# Patient Record
Sex: MALE | Race: OTHER | HISPANIC OR LATINO | URBAN - METROPOLITAN AREA
[De-identification: names, ages, dates, MRNs, and addresses within clinical notes are randomized per-mention and may not be internally consistent; named-entity substitution may affect disease eponyms.]

---

## 2018-08-19 ENCOUNTER — EMERGENCY (EMERGENCY)
Facility: HOSPITAL | Age: 63
LOS: 1 days | Discharge: HOME | End: 2018-08-19
Attending: EMERGENCY MEDICINE
Payer: COMMERCIAL

## 2018-08-19 VITALS
HEIGHT: 68 IN | DIASTOLIC BLOOD PRESSURE: 87 MMHG | TEMPERATURE: 98 F | HEART RATE: 72 BPM | RESPIRATION RATE: 18 BRPM | OXYGEN SATURATION: 97 % | WEIGHT: 179.9 LBS | SYSTOLIC BLOOD PRESSURE: 138 MMHG

## 2018-08-19 VITALS
OXYGEN SATURATION: 97 % | SYSTOLIC BLOOD PRESSURE: 117 MMHG | RESPIRATION RATE: 18 BRPM | HEART RATE: 64 BPM | DIASTOLIC BLOOD PRESSURE: 88 MMHG | TEMPERATURE: 97 F

## 2018-08-19 PROCEDURE — 82962 GLUCOSE BLOOD TEST: CPT

## 2018-08-19 PROCEDURE — 99284 EMERGENCY DEPT VISIT MOD MDM: CPT | Mod: 25

## 2018-08-19 PROCEDURE — 96375 TX/PRO/DX INJ NEW DRUG ADDON: CPT

## 2018-08-19 PROCEDURE — 96374 THER/PROPH/DIAG INJ IV PUSH: CPT

## 2018-08-19 PROCEDURE — 99283 EMERGENCY DEPT VISIT LOW MDM: CPT

## 2018-08-19 RX ORDER — SODIUM CHLORIDE 9 MG/ML
1000 INJECTION INTRAMUSCULAR; INTRAVENOUS; SUBCUTANEOUS ONCE
Qty: 0 | Refills: 0 | Status: COMPLETED | OUTPATIENT
Start: 2018-08-19 | End: 2018-08-19

## 2018-08-19 RX ORDER — DIPHENHYDRAMINE HCL 50 MG
25 CAPSULE ORAL ONCE
Qty: 0 | Refills: 0 | Status: COMPLETED | OUTPATIENT
Start: 2018-08-19 | End: 2018-08-19

## 2018-08-19 RX ORDER — FAMOTIDINE 10 MG/ML
1 INJECTION INTRAVENOUS
Qty: 14 | Refills: 0 | OUTPATIENT
Start: 2018-08-19 | End: 2018-08-25

## 2018-08-19 RX ORDER — DIPHENHYDRAMINE HCL 50 MG
1 CAPSULE ORAL
Qty: 15 | Refills: 0 | OUTPATIENT
Start: 2018-08-19 | End: 2018-08-23

## 2018-08-19 RX ORDER — FAMOTIDINE 10 MG/ML
20 INJECTION INTRAVENOUS ONCE
Qty: 0 | Refills: 0 | Status: COMPLETED | OUTPATIENT
Start: 2018-08-19 | End: 2018-08-19

## 2018-08-19 RX ADMIN — Medication 125 MILLIGRAM(S): at 10:34

## 2018-08-19 RX ADMIN — FAMOTIDINE 20 MILLIGRAM(S): 10 INJECTION INTRAVENOUS at 10:34

## 2018-08-19 RX ADMIN — SODIUM CHLORIDE 1000 MILLILITER(S): 9 INJECTION INTRAMUSCULAR; INTRAVENOUS; SUBCUTANEOUS at 10:34

## 2018-08-19 RX ADMIN — Medication 25 MILLIGRAM(S): at 10:34

## 2018-08-19 NOTE — ED STATDOCS - ATTENDING CONTRIBUTION TO CARE
The patient seen and examined.  The patient has allergic reaction to dye.  No airway compromise.    I, Zackary Sanz, performed the initial face to face bedside interview with this patient regarding history of present illness, review of symptoms and relevant past medical, social and family history.  I completed an independent physical examination.  I was the initial provider who evaluated this patient. I have signed out the follow up of any pending tests (i.e. labs, radiological studies) to the ACP.  I have communicated the patient’s plan of care and disposition with the ACP.

## 2018-08-19 NOTE — ED ADULT NURSE NOTE - CADM POA PRESS ULCER
Exam is normal today  Please follow up with the otolaryngology (ENT) physician at your appointment that's already scheduled  If you have worsening symptoms or dizziness, nausea, vomiting, pain, please be re-evaluated before then      At [facility], we strive to deliver an exceptional experience to you, every time we see you.  If you receive a survey in the mail, please send us back your thoughts. We really do value your feedback.    Based on your medical history, these are the current health maintenance/preventive care services that you are due for (some may have been done at this visit.)  Health Maintenance Due   Topic Date Due     BMP Q6 MOS  07/22/2018         Suggested websites for health information:  Www.Intelligroup.IsoPlexis : Up to date and easily searchable information on multiple topics.  Www.medlineplus.gov : medication info, interactive tutorials, watch real surgeries online  Www.familydoctor.org : good info from the Academy of Family Physicians  Www.cdc.gov : public health info, travel advisories, epidemics (H1N1)  Www.aap.org : children's health info, normal development, vaccinations  Www.health.state.[place] : [place] dept of health, public health issues in [place], N1N1    Your care team:                            Family Medicine Internal Medicine   MD Tre Hicks MD Shantel Branch-Fleming, MD Katya Georgiev PA-C Nam Ho, MD Pediatrics   ABY Snyder, MD Maritza Tinsley CNP, MD Deborah Mielke, MD Kim Thein, APRN [facility]      Clinic hours: Monday - Thursday 7 am-7 pm; Fridays 7 am-5 pm.   Urgent care: Monday - Friday 11 am-9 pm; Saturday and Sunday 9 am-5 pm.  Pharmacy : Monday -Thursday 8 am-8 pm; Friday 8 am-6 pm; Saturday and Sunday 9 am-5 pm.     Clinic: [phone]   Pharmacy: [phone]    
No

## 2018-08-19 NOTE — ED STATDOCS - MEDICAL DECISION MAKING DETAILS
Ears: no ear pain and no hearing problems.Nose: no nasal congestion and no nasal drainage.Mouth/Throat: no dysphagia, no hoarseness and no throat pain.Neck: no lumps, no pain, no stiffness and no swollen glands. contact dermatitis: FS, pepcid, steroids, benadry, d/c dye from hair

## 2018-08-19 NOTE — ED STATDOCS - PROGRESS NOTE DETAILS
patient re-evaluated feeling better, swelling improved RX pepcid, benadryl, prednisone to f/u with allergist and DC dye

## 2018-08-19 NOTE — ED STATDOCS - OBJECTIVE STATEMENT
This is a 63 year old male with c/o allergic reaction x 1 day.  He notes recently dyed his hair to black tint.  She notes This is a 63 year old male with c/o allergic reaction x 1 day.  He notes recently dyed his hair to black tint.  He notes no prior use of dye.  He reports h/o DM controlled with diet, not currently taking any medications.  PCP Valerie.  He denies any SOB, chest pain, n/v/d or any sick contacts, recent travel or rashes.

## 2018-08-19 NOTE — ED ADULT TRIAGE NOTE - CHIEF COMPLAINT QUOTE
Patient is awake and oriented times 4, complains of an allergic reaction that started yesterday, patient noticed swelling, redness, and itchiness to his lips and chin s/p dying his beard, patient denies any difficulty breathing or swallowing

## 2019-02-26 ENCOUNTER — APPOINTMENT (OUTPATIENT)
Dept: ORTHOPEDIC SURGERY | Facility: CLINIC | Age: 64
End: 2019-02-26

## 2019-03-11 ENCOUNTER — APPOINTMENT (OUTPATIENT)
Dept: ORTHOPEDIC SURGERY | Facility: CLINIC | Age: 64
End: 2019-03-11

## 2019-12-28 ENCOUNTER — APPOINTMENT (OUTPATIENT)
Dept: ORTHOPEDIC SURGERY | Facility: CLINIC | Age: 64
End: 2019-12-28

## 2020-01-04 ENCOUNTER — APPOINTMENT (OUTPATIENT)
Dept: ORTHOPEDIC SURGERY | Facility: CLINIC | Age: 65
End: 2020-01-04
Payer: COMMERCIAL

## 2020-01-04 VITALS
BODY MASS INDEX: 27.28 KG/M2 | SYSTOLIC BLOOD PRESSURE: 124 MMHG | DIASTOLIC BLOOD PRESSURE: 76 MMHG | HEART RATE: 70 BPM | HEIGHT: 68 IN | WEIGHT: 180 LBS

## 2020-01-04 DIAGNOSIS — Z78.9 OTHER SPECIFIED HEALTH STATUS: ICD-10-CM

## 2020-01-04 DIAGNOSIS — M24.812 OTHER SPECIFIC JOINT DERANGEMENTS OF LEFT SHOULDER, NOT ELSEWHERE CLASSIFIED: ICD-10-CM

## 2020-01-04 DIAGNOSIS — Z86.39 PERSONAL HISTORY OF OTHER ENDOCRINE, NUTRITIONAL AND METABOLIC DISEASE: ICD-10-CM

## 2020-01-04 PROCEDURE — 99204 OFFICE O/P NEW MOD 45 MIN: CPT

## 2020-01-04 PROCEDURE — 73030 X-RAY EXAM OF SHOULDER: CPT | Mod: LT

## 2020-01-04 RX ORDER — MELOXICAM 15 MG/1
15 TABLET ORAL DAILY
Qty: 30 | Refills: 0 | Status: ACTIVE | COMMUNITY
Start: 2020-01-04 | End: 1900-01-01

## 2020-01-04 NOTE — PHYSICAL EXAM
[de-identified] : 4 views x-rays left shoulder taken in office today, 1/4/2020: Type 3 acromial spur noted.  Osteoarthritis seen within the glenohumeral joint mild on x-ray. No fractures or dislocations present.  No other abnormalities seen. [de-identified] : Laterality: Left shoulder\par \par General: Alert and oriented x3.  In no acute distress.  Pleasant in nature with a normal affect.  No apparent respiratory distress. \par Erythema, Warmth, Rubor: Negative\par Swelling: Negative\par \par ROM:\par FF: 90 degrees active with pain.  PROM: 140 degrees with pain. \par ER: 45 degrees compared to 85 degrees right shoulder\par Abduction: 70 degrees with pain\par IR: Normal\par IR behind back: Left Glute due to pain. \par \par Special Test:\par Empty Can Sign: Positive\par Newberry's Sign: Positive\par Dozier/Neer Sign: Positive\par Speed's Sign: Negative\par Yergason's Sign: Negative\par Apprehension/Relocation: Negative\par Sulcus Sign: Negative\par Cross Arm Adduction/Pain over AC-J: Negative\par Belly Press/Lift Off Behind Back: Negative\par \par Neurovascularly intact motor and sensory\par

## 2020-01-04 NOTE — DISCUSSION/SUMMARY
[de-identified] : Assessment: Chronic left shoulder pain.\par \par Plan:\par #1. MRI of left shoulder order without contrast to rule out rotator cuff tear.\par #2. Meloxicam 15 mg prescribed to take as directed with food.\par #3. Referral to followup with Dr. Arnold Barbour post MRI to review.\par #4. All of the patient's questions answered. He understands the treatment course at this time.

## 2020-01-04 NOTE — REASON FOR VISIT
[Initial Visit] : an initial visit for [Other: ____] : [unfilled] [[employer] ] : provided by [employer]   [FreeTextEntry1] : [id number] [FreeTextEntry2] : Aren [TWNoteComboBox1] : Gabonese

## 2020-01-04 NOTE — HISTORY OF PRESENT ILLNESS
[de-identified] : Xavier is a 64-year-old male who is complaining of chronic left shoulder pain. The pain has been going on for years. The patient is very active and works out in a gym. He states that the shoulder is causing him 9/10 pain today. The pain affects him at night at rest and it also affects his sleep. He is unable to move his arm over his head without severe pain. He denies numbness and tingling going down the left upper extremity. He has no other complaints.

## 2020-01-07 PROBLEM — M24.812 INTERNAL DERANGEMENT OF LEFT SHOULDER: Status: ACTIVE | Noted: 2020-01-04

## 2020-02-03 ENCOUNTER — APPOINTMENT (OUTPATIENT)
Dept: ORTHOPEDIC SURGERY | Facility: CLINIC | Age: 65
End: 2020-02-03
Payer: COMMERCIAL

## 2020-02-03 VITALS — WEIGHT: 183 LBS | BODY MASS INDEX: 27.74 KG/M2 | HEIGHT: 68 IN

## 2020-02-03 DIAGNOSIS — M75.42 IMPINGEMENT SYNDROME OF LEFT SHOULDER: ICD-10-CM

## 2020-02-03 DIAGNOSIS — G89.29 PAIN IN LEFT SHOULDER: ICD-10-CM

## 2020-02-03 DIAGNOSIS — S46.012A STRAIN OF MUSCLE(S) AND TENDON(S) OF THE ROTATOR CUFF OF LEFT SHOULDER, INITIAL ENCOUNTER: ICD-10-CM

## 2020-02-03 DIAGNOSIS — M25.512 PAIN IN LEFT SHOULDER: ICD-10-CM

## 2020-02-03 PROCEDURE — 99213 OFFICE O/P EST LOW 20 MIN: CPT

## 2020-02-03 NOTE — PHYSICAL EXAM
[de-identified] : Physical Exam:\par General: Well appearing, no acute distress, A&O\par Neurologic: A&Ox3, No focal deficits\par Head: NCAT without abrasions, lacerations, or ecchymosis to head, face, or scalp\par Eyes: No scleral icterus, no gross abnormalities\par Respiratory: Equal chest wall expansion bilaterally, no accessory muscle use\par Lymphatic: No lymphadenopathy palpated\par Skin: Warm and dry\par Psychiatric: Normal mood and affect\par Left Shoulder\par ·	Inspection/Palpation: no tenderness, swelling or deformities\par ·	Range of Motion: no crepitus with ROM; Active FF 90; ER at side 15; IR to belt; Passive ; ER at side 45; IR to belt\par ·	Strength: forward elevation in scapular plane [4/5], internal rotation [4/5], external rotation [4/5], adduction [4/5] and abduction [4/5]\par ·	Stability: no joint instability on provocative testing\par ·	Tests: Dozier test positive, Neer positive, positive drop arm test secondary to pain, bear hug test positive, Napolean sign negative, cross arm adduction negative, lift off sign positive, hornblowers sign negative, speeds test negative, Yergason's test negative, no bicipital groove tenderness, Gill's Active Compression test negative, whipple test positive, bicep's load II test negative\par \par Right Shoulder\par ·	Inspection/Palpation: no tenderness, swelling or deformities\par ·	Range of Motion: full and painless in all planes, no crepitus\par ·	Strength: forward elevation in scapular plane 5/5, internal rotation 5/5, external rotation 5/5, adduction 5/5 and abduction 5/5\par ·	Stability: no joint instability on provocative testing\par ·	Tests: Dozier test negative, Neer sign negative, negative drop arm test secondary to pain, bear hug test negative, Napolean sign negative, cross arm adduction negative, lift off sign positive, hornblowers sign negative, speeds test negative, Yergason's test negative, no bicipital groove tenderness, Gill's Active Compression test negative [de-identified] : MRI performed at outside facility was available for me to review.  This demonstrates a full-thickness tear of the supraspinatus with retraction to the lateral portion of the humeral head.  Mild tendinosis of the supraspinatus.  Mild tendinosis of the infraspinatus and subscapularis.

## 2020-02-03 NOTE — DISCUSSION/SUMMARY
[de-identified] : Xavier is a 64-year-old male who comes in complaining of left shoulder pain.  He saw Corrigan visit ordered an MRI showing a full-thickness tear of his rotator cuff.  He attempted conservative measures with oral anti-inflammatories and physical therapy.  He was completely refractory.  Today had a thorough discussion with him regarding operative and nonoperative management.  After thorough discussion the patient elected to defer all other conservative care and move forward with operative intervention.  All risks, benefits and alternatives to the proposed surgical procedure, left shoulder arthroscopy with rotator cuff repair and possible biceps tenodesis, were discussed in great detail with the patient. Risks include but are not limited to pain, bleeding, infection, stiffness, [default value], medical complications (including DVT, PE, MI), and risks of anesthesia. The patient expressed understanding and all questions were answered. The patient is electing to proceed, and will have the patient scheduled accordingly.

## 2020-02-03 NOTE — HISTORY OF PRESENT ILLNESS
[8] : a maximum pain level of 8/10 [de-identified] : YIFAN is a 64 year male who presents today with complaints of left shoulder pain.  Patient saw Zan Galicia and an MRI was ordered.  He states his left shoulder pain is progressively worsened over the past 4 months and is worse with overhead activities.  Appears to be relieved with rest.  He also planes of significant weakness.  He denies numbness or paresthesias.  He has tried oral anti-inflammatories and physical therapy and has been completely refractory.  At baseline her his pain is a 4 out of 10.\par

## 2020-03-11 ENCOUNTER — EMERGENCY (EMERGENCY)
Facility: HOSPITAL | Age: 65
LOS: 1 days | Discharge: HOME | End: 2020-03-11
Attending: EMERGENCY MEDICINE
Payer: COMMERCIAL

## 2020-03-11 VITALS
HEART RATE: 79 BPM | SYSTOLIC BLOOD PRESSURE: 143 MMHG | DIASTOLIC BLOOD PRESSURE: 83 MMHG | TEMPERATURE: 98 F | HEIGHT: 68 IN | WEIGHT: 179.9 LBS | RESPIRATION RATE: 18 BRPM | OXYGEN SATURATION: 98 %

## 2020-03-11 PROCEDURE — 93010 ELECTROCARDIOGRAM REPORT: CPT

## 2020-03-11 PROCEDURE — 99284 EMERGENCY DEPT VISIT MOD MDM: CPT | Mod: 25

## 2020-03-11 PROCEDURE — 96372 THER/PROPH/DIAG INJ SC/IM: CPT

## 2020-03-11 PROCEDURE — 99284 EMERGENCY DEPT VISIT MOD MDM: CPT

## 2020-03-11 PROCEDURE — 72125 CT NECK SPINE W/O DYE: CPT | Mod: 26

## 2020-03-11 PROCEDURE — T1013: CPT

## 2020-03-11 PROCEDURE — 72125 CT NECK SPINE W/O DYE: CPT

## 2020-03-11 PROCEDURE — 93005 ELECTROCARDIOGRAM TRACING: CPT

## 2020-03-11 RX ORDER — METHOCARBAMOL 500 MG/1
2 TABLET, FILM COATED ORAL
Qty: 30 | Refills: 0
Start: 2020-03-11 | End: 2020-03-15

## 2020-03-11 RX ORDER — IBUPROFEN 200 MG
1 TABLET ORAL
Qty: 28 | Refills: 0
Start: 2020-03-11 | End: 2020-03-17

## 2020-03-11 RX ORDER — METHOCARBAMOL 500 MG/1
1500 TABLET, FILM COATED ORAL ONCE
Refills: 0 | Status: COMPLETED | OUTPATIENT
Start: 2020-03-11 | End: 2020-03-11

## 2020-03-11 RX ORDER — KETOROLAC TROMETHAMINE 30 MG/ML
30 SYRINGE (ML) INJECTION ONCE
Refills: 0 | Status: DISCONTINUED | OUTPATIENT
Start: 2020-03-11 | End: 2020-03-11

## 2020-03-11 RX ADMIN — METHOCARBAMOL 1500 MILLIGRAM(S): 500 TABLET, FILM COATED ORAL at 20:03

## 2020-03-11 RX ADMIN — Medication 30 MILLIGRAM(S): at 20:02

## 2020-03-11 NOTE — ED PROVIDER NOTE - PATIENT PORTAL LINK FT
You can access the FollowMyHealth Patient Portal offered by [facility] by registering at the following website: [email or web address]. By joining [employer]’s FollowMyHealth portal, you will also be able to view your health information using other applications (apps) compatible with our system.

## 2020-03-11 NOTE — ED PROVIDER NOTE - PHYSICAL EXAMINATION
Const: Awake, alert and oriented. In no acute distress. Well appearing. Sitting in C-collar  HEENT: NC/AT. Moist mucous membranes.  Eyes: No scleral icterus. EOMI.  Neck:. Full ROM without pain. TTP midline around C5-C7 as well as paracervical b/l  Cardiac: Regular rate and regular rhythm. +S1/S2. Peripheral pulses 2+ and symmetric. No LE edema.  Resp: Speaking in full sentences. No evidence of respiratory distress. No wheezes, rales or rhonchi.  Abd: Soft, non-tender, non-distended. Normal bowel sounds in all 4 quadrants. No guarding or rebound.  Back/MSK: Spine midline and non-tender. No CVAT. Mild ttp across upper back, b/l paraspinal thoracic region. FROM extremities x 4  Skin: No seatbelt sign, rashes, abrasions or lacerations.  Neuro: Awake, alert & oriented x 3. CN II-XII intact. Sensorimotor intact x 4. No focal deficits. Pt ambulatory with steady gait

## 2020-03-11 NOTE — ED PROVIDER NOTE - NS ED ROS FT
Gen: denies fever, chills, fatigue, weight loss  Skin: denies rashes, laceration, bruising  HEENT: denies visual changes, ear pain, nasal congestion, throat pain  Respiratory: denies WALTON, SOB, cough, wheezing  Cardiovascular: denies chest pain, palpitations, diaphoresis, LE edema  GI: denies abdominal pain, n/v/d  : denies dysuria, frequency, urgency, bowel/bladder incontinence  MSK: +Neck pain, Upper back pain. Denies joint swelling/pain  Neuro: denies headache, dizziness, weakness, numbness  Psych: denies anxiety, depression, SI/HI, visual/auditory hallucinations
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
## 2020-04-07 ENCOUNTER — APPOINTMENT (OUTPATIENT)
Dept: ORTHOPEDIC SURGERY | Facility: HOSPITAL | Age: 65
End: 2020-04-07

## 2021-04-14 ENCOUNTER — APPOINTMENT (OUTPATIENT)
Dept: NEUROLOGY | Facility: CLINIC | Age: 66
End: 2021-04-14

## 2023-11-12 ENCOUNTER — EMERGENCY (EMERGENCY)
Facility: HOSPITAL | Age: 68
LOS: 1 days | Discharge: HOME | End: 2023-11-12
Attending: EMERGENCY MEDICINE
Payer: COMMERCIAL

## 2023-11-12 VITALS
HEIGHT: 67 IN | OXYGEN SATURATION: 98 % | WEIGHT: 175.05 LBS | RESPIRATION RATE: 18 BRPM | HEART RATE: 87 BPM | SYSTOLIC BLOOD PRESSURE: 143 MMHG | TEMPERATURE: 98 F | DIASTOLIC BLOOD PRESSURE: 90 MMHG

## 2023-11-12 PROCEDURE — 99284 EMERGENCY DEPT VISIT MOD MDM: CPT

## 2023-11-12 PROCEDURE — 73030 X-RAY EXAM OF SHOULDER: CPT

## 2023-11-12 PROCEDURE — 71046 X-RAY EXAM CHEST 2 VIEWS: CPT | Mod: 26

## 2023-11-12 PROCEDURE — 73080 X-RAY EXAM OF ELBOW: CPT | Mod: 26,LT

## 2023-11-12 PROCEDURE — T1013: CPT

## 2023-11-12 PROCEDURE — 73080 X-RAY EXAM OF ELBOW: CPT

## 2023-11-12 PROCEDURE — 73030 X-RAY EXAM OF SHOULDER: CPT | Mod: 26,RT

## 2023-11-12 PROCEDURE — 71046 X-RAY EXAM CHEST 2 VIEWS: CPT

## 2023-11-12 RX ORDER — ACETAMINOPHEN 500 MG
975 TABLET ORAL ONCE
Refills: 0 | Status: COMPLETED | OUTPATIENT
Start: 2023-11-12 | End: 2023-11-12

## 2023-11-12 RX ADMIN — Medication 975 MILLIGRAM(S): at 16:12

## 2023-11-12 NOTE — ED PROVIDER NOTE - NS ED ATTENDING STATEMENT MOD
This was a shared visit with the MI. I reviewed and verified the documentation and independently performed the documented:
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
## 2023-11-12 NOTE — ED PROVIDER NOTE - PHYSICAL EXAMINATION
Vitals: WNL  Gen: laying comfortably in NAD   Head: NC, swollen rip upper lip with abrasion to mucosa of inner lip no obvious deep laceration  ENT: EOMI. No exudates  CV: RRR. Audible S1 and S2. No murmurs. 2+ radial and DP pulses   Pulm: Clear to auscultation bilaterally. No wheezes, rales, or rhonchi  Abd: soft, NTND, no rebound, no guarding  Musculoskeletal:  No peripheral edema, no calf ttp, FROM left elbow no hematoma, + ttp over left olecranon, + limited ROM right elbow secondary to pain   Neurologic: AAOx3, no focal deficits  : no CVA tenderness

## 2023-11-12 NOTE — ED PROVIDER NOTE - CARE PLAN
Principal Discharge DX:	Right shoulder pain  Secondary Diagnosis:	Right elbow pain  Secondary Diagnosis:	Chest pain, muscular   1
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
## 2023-11-12 NOTE — ED PROVIDER NOTE - PATIENT PORTAL LINK FT
You can access the FollowMyHealth Patient Portal offered by [facility] by registering at the following website: [email or web address]. By joining [facility]’s FollowMyHealth portal, you will also be able to view your health information using other applications (apps) compatible with our system.

## 2023-11-12 NOTE — ED PROVIDER NOTE - ATTENDING APP SHARED VISIT CONTRIBUTION OF CARE
I personally saw the patient with the MI, and completed the key components of the history and physical exam. I then discussed the management plan with the MI.

## 2023-11-12 NOTE — ED PROVIDER NOTE - PROGRESS NOTE DETAILS
Pt moved form intake Room. Pt seen and evaluated by intake Physician. HPI, Physical examination performed by intake Physician . Note reviewed and followup examination performed by me consistent with initial assessment. Agrees with intake Physician plan and tests. All x-rays are within normal limits.  Patient made aware of x-ray findings.  Patient D/C stable condition to follow-up with his primary care provider as discussed.